# Patient Record
(demographics unavailable — no encounter records)

---

## 2024-11-05 NOTE — HISTORY OF PRESENT ILLNESS
[de-identified] : Age: 70 year F PMHx: HTN, HLD, Hypothyroidism Hand Dominance: RHD Chief Complaint: Left middle finger pain s/p trauma 05/16/24. Patient reports that she had closed her middle finger in a door, sustaining her injury. Patient went to [facility] 05/16/24 and had radiographs performed that confirmed the fracture. Patient was prompted to f/u with an orthopedist for further evaluation. Taking Cephalexin PRN. Reports an intermittent throb.  Trauma: 05/16/24 Outside Imaging/Treatment: radiographs from [facility] 05/16/24 OTC Medications: Cephalexin  OT/PT: none Bracing: finger in splint Pain worse with: palpation Pain better with: rest  06/10/24: F/u left middle finger.  Patient reports that she is doing well. Patient presents in splint and states that she wears it as a precaution. Patient reports no pain, only altered sensation at the distal aspect of her left middle finger. Patient reports that she completed her round of cephalexin.   *** NEW COMPLAINT*** 11/04/24: Patient presents with right hand issues onset late October 2024. Patient reports that she has noticed bumps on the distal aspects of the fingers of her right hand. Patient reports no pain or discomfort, but is concerned about the etiology of the bumps. Denies numbness/tingling.

## 2024-11-05 NOTE — IMAGING
[de-identified] : Left middle finger with resolved ecchymosis and swelling, no erythema nor drainage. Mild ttp at P3. Able to fully flex and extend at MCP, PIP and DIP with no rotational deformity. Sensation intact at radial and ulnar pulp. <2sec cap refill.  Left middle finger distal phalanx shaft fracture, minimally displaced. Alignment maintained. +callus.

## 2024-11-05 NOTE — ASSESSMENT
[FreeTextEntry1] : Left middle finger distal phalanx shaft fracture, minimally displaced - reviewed radiographs and pathoanatomy with patient. Discussed the current alignment both radiographically and clinically are within acceptable parameters to manage nonoperatively. Will manage in coban azul tape, ROM permitted. Elevate. Discussed risks of pain, stiffness, and displacement requiring intervention. Risk of infection.  EXAM Right finger with mucous cyst. No erythema nor drainage, mild ttp. Able to flex and extend at MCP, PIP and DIP. Sensation intact throughout. <2sec cap refill   Right finger radiographs with DIP arthrosis. No fracture nor dislocation. (3-view)   ASSESSMENT/PLAN Right finger mucous cyst - reviewed pathoanatomy with patient. Discussed management to consist of NSAIDs prn, activity modification and finger sleeves during use. Discussed excision is a potential operative intervention but is with a high rate of recurrence.   F/u prn

## 2024-12-12 NOTE — HISTORY OF PRESENT ILLNESS
[Sudden] : sudden [5] : 5 [0] : 0 [Burning] : burning [Dull/Aching] : dull/aching [Sharp] : sharp [Throbbing] : throbbing [Intermittent] : intermittent [Household chores] : household chores [Leisure] : leisure [Social interactions] : social interactions [Rest] : rest [Retired] : Work status: retired [de-identified] : Patient here for right ankle. Patient states NKI. Patient states pain is in the Achilles. Patient states pain started 2-3 weeks ago. Patient states pain is sharp and burning only with ROM. Patient had MRI of her right ankle on 10/10/24 at . Patient has been wearing custom orthotics.  Impression: 1) Subcortical cystic-appearing changes in the lateral process of the talus and the underlying anterosuperior calcaneus. This in addition to mild hindfoot valgus raises suspicion of lateral hindfoot instability. 2) Partial obliteration of fat signal in the sinus tarsi suggestive of sinus tarsi syndrome but clinical correlation recommended. 3) Mild posterior tibialis tendinosis. 4) Mild multifocal degenerative changes. [] : Post Surgical Visit: no [FreeTextEntry1] : Right ankle  [FreeTextEntry3] : 2-3 weeks ago  [FreeTextEntry5] : NKI  [de-identified] : Movement

## 2024-12-12 NOTE — DATA REVIEWED
[MRI] : MRI [Right] : of the right [Ankle] : ankle [I independently reviewed and interpreted images and report] : I independently reviewed and interpreted images and report [FreeTextEntry1] : Micki; Impression: 1) Subcortical cystic-appearing changes in the lateral process of the talus and the underlying anterosuperior calcaneus. This in addition to mild hindfoot valgus raises suspicion of lateral hindfoot instability. 2) Partial obliteration of fat signal in the sinus tarsi suggestive of sinus tarsi syndrome but clinical correlation recommended. 3) Mild posterior tibialis tendinosis. 4) Mild multifocal degenerative changes.

## 2024-12-12 NOTE — ASSESSMENT
[FreeTextEntry1] : 72 yo female presenting today with insertional achilles tendinitis, eufemia's deformity. Patient with posterior tibial tendon dysfunction which is managed with custom orthotics. Recommend non-surgical management -Rx PT given today -Discussed with patient that csi is not recommended due to risk of tendon injury -Activities as tolerated -Rest, ice, compression, elevation, NSAIDs PRN for pain.  -All questions answered -F/u 6 weeks  The diagnosis was explained in detail. The potential non-surgical and surgical treatments were reviewed. The relative risks and benefits of each option were considered relative to the patients age, activity level, medical history, symptom severity and previously attempted treatments.  The patient was advised to consult with their primary medical provider prior to initiation of any new medications to reduce the risk of adverse effects specific to their long-term home medications and medical history. The risk of gastrointestinal irritation and kidney injury specific to long-term NSAID use was discussed.  Entered by Shane Veronica PA-C acting as scribe. Dr. Owen Attestation The documentation recorded by the scribe, in my presence, accurately reflects the service I, Dr. Owen, personally performed, and the decisions made by me with my edits as appropriate.

## 2024-12-12 NOTE — PHYSICAL EXAM
[de-identified] : Examination of the right foot and ankle is as follows: INSPECTION: mild swelling over achilles tendon, but no abrasion, no laceration, no erythema, no ecchymosis, no gross deformity, no ecchymosis of foot or ankle PALPATION: achilles tendon insertion tenderness ROM: dorsiflexion 20 degrees, plantar flexion 40 degrees, inversion 30 degrees, eversion 20 degrees STRENGTH: dorsiflexion 5/5, plantarflexion 5/5, inversion 5/5, eversion 5/5, EHL 5/5, FHL 5/5 TESTING: negative Mcdonald test VASCULAR: dorsalis pedis pulse: 2+, posterior tibialis pulse: 2+ NEURO: Sensation present to light touch in all distributions GAIT: mildly antalgic, but patient ambulates without assistive device   X-rays of the right ankle 3 view AP/Lateral/Oblique: mild eufemia's deformity.

## 2025-02-13 NOTE — ASSESSMENT
[FreeTextEntry1] : 12/2023 MRI of L-Spine was reviewed today and is as follows:  Spondylolisthesis L3-4 with moderate central and severe bilateral foraminal stenosis  Moderate stenosis L4-5  72 yo female presents today for eval of her low back. Patient with persistent low back pain and LLE radicular symptoms/weakness. Recommend proceeding with MRI for further evaluation given persistent symptoms.   - Patient given prescription for MRI, follow up after study is completed to discuss results.   - Continue physical therapy to regain range of motion, strengthening and symptomatic improvement. Prescription given in office today.   - Has had injections with SBPM in the past  - Recent stent placement in 9/2024, on Plavix  - Recommend heating pad use to decrease muscle spasm - Discussed the importance of home exercises, including but not limited to hamstring stretching and core strengthening   Patient was educated on their diagnosis today. All questions answered and patient expressed understanding.  Follow up after MRI

## 2025-02-13 NOTE — HISTORY OF PRESENT ILLNESS
[de-identified] : Body part: lower back Better/ Worse/ Same since last visit: same Treatments since last visit: PT 2x a week at [facility] [place]  Difficulty with: prolonged standing Radicular symptoms: up into the middle back and shoulder blades, into the left thigh, denies numbness/tingling Current medications for pain: Lyrica, Tylenol PRN Assistive walking device:  none   Today's Pain: in the morning 6-7/10, now 5/10   **Patient had a cardiac stent placed in 9/2024 and is taking Plavix and ASA**

## 2025-03-20 NOTE — DATA REVIEWED
[MRI] : MRI [Lumbar Spine] : lumbar spine [I independently reviewed and interpreted images and report] : I independently reviewed and interpreted images and report [FreeTextEntry1] : Severe central stenosis L3/4 with bilateral foraminal stenosis L4/5 Advanced Central stenosis with bialteral foraminal stnosis R>L

## 2025-03-20 NOTE — HISTORY OF PRESENT ILLNESS
[de-identified] : Body part: lower back Better/ Worse/ Same since last visit: same Treatments since last visit: lumbar spine MRI at [facility], PT 1x a week at [facility] in [place], HEP PRN Difficulty with: prolonged standing Radicular symptoms: up into the middle back and shoulder blades, slightly into the left thigh, denies numbness/tingling Current medications for pain: Lyrica and Flexeril, Tylenol PRN Assistive walking device:  none   Today's Pain: 7/10

## 2025-03-20 NOTE — ASSESSMENT
[FreeTextEntry1] : 12/2023 MRI of L-Spine was reviewed today and is as follows:  Spondylolisthesis L3-4 with moderate central and severe bilateral foraminal stenosis  Moderate stenosis L4-5  70 yo female presents today for eval of her low back. Patient with persistent low back pain and LLE radicular symptoms/weakness. Reviewed MRI of Lumbar spine, demonstrated severe central and foraminal stenosis, progressed from previous imaging. At this time, recommend patient for MRI of thoracic region to evaluate further.   - Patient given prescription for MRI, follow up after study is completed to discuss results.   - Recommend physical therapy to regain range of motion, strengthening and symptomatic improvement. Prescription given in office today.   - At this time the patient is on Plavix, will withhold any injection therapies/surgical modalities  - Has had injections with SBPM in the past  - Recent stent placement in 9/2024, on Plavix  - Recommend heating pad use to decrease muscle spasm - Discussed the importance of home exercises, including but not limited to hamstring stretching and core strengthening   Patient was educated on their diagnosis today. All questions answered and patient expressed understanding.  Follow up 2 months

## 2025-03-25 NOTE — ASSESSMENT
[FreeTextEntry1] : 70 yo female presenting today with resolved insertional achilles tendinitis, eufemia's deformity. Patient with posterior tibial tendon dysfunction which is managed with custom orthotics. Recommend continued non-surgical management -transition from PT to HEP -Activities as tolerated -Rest, ice, compression, elevation, NSAIDs PRN for pain.  -All questions answered -F/u prn  The diagnosis was explained in detail. The potential non-surgical and surgical treatments were reviewed. The relative risks and benefits of each option were considered relative to the patients age, activity level, medical history, symptom severity and previously attempted treatments.  The patient was advised to consult with their primary medical provider prior to initiation of any new medications to reduce the risk of adverse effects specific to their long-term home medications and medical history. The risk of gastrointestinal irritation and kidney injury specific to long-term NSAID use was discussed.

## 2025-03-25 NOTE — PHYSICAL EXAM
[de-identified] : Examination of the right foot and ankle is as follows: INSPECTION: no swelling over achilles tendon, but no abrasion, no laceration, no erythema, no ecchymosis, no gross deformity, no ecchymosis of foot or ankle PALPATION: minimal achilles tendon insertion tenderness ROM: dorsiflexion 20 degrees, plantar flexion 40 degrees, inversion 30 degrees, eversion 20 degrees STRENGTH: dorsiflexion 5/5, plantarflexion 5/5, inversion 5/5, eversion 5/5, EHL 5/5, FHL 5/5 TESTING: negative Mcdonald test VASCULAR: dorsalis pedis pulse: 2+, posterior tibialis pulse: 2+ NEURO: Sensation present to light touch in all distributions GAIT: normal gait   X-rays of the right ankle 3 view AP/Lateral/Oblique: mild eufemia's deformity.

## 2025-04-11 NOTE — HISTORY OF PRESENT ILLNESS
[Patient reported mammogram was normal] : Patient reported mammogram was normal [Patient reported PAP Smear was normal] : Patient reported PAP Smear was normal [Patient reported colonoscopy was normal] : Patient reported colonoscopy was normal [N] : Patient denies prior pregnancies [Currently Active] : currently active [Men] : men [Vaginal] : vaginal [No] : No [FreeTextEntry1] : Patient here for annual visit.  Had a significant medical year requiring cardiac stenting in September! Is now cleared from cardiac rehab and doing ok. Saw UroGyn, Dr. Smith, and is now on medication for overactive bladder and feels it is helping. Is not having any VB. Denies breast lumps, pain or D/C.  [TextBox_4] : Overactive blader- followed by Dr. Smith [Mammogramdate] : 03/25 [TextBox_19] : TERRELL- Normal [PapSmeardate] : 01/23/23 [TextBox_31] : Negative [BoneDensityDate] : 3/25 [TextBox_37] : Normal spine, T= -1.0 to -1.6 in hip, stable- August [ColonoscopyDate] : 4/23/24 [TextBox_43] : with endoscopy- Benign polyps- due q 3 years [LMPDate] : 2004

## 2025-04-11 NOTE — PHYSICAL EXAM
[Appropriately responsive] : appropriately responsive [Alert] : alert [No Acute Distress] : no acute distress [No Lymphadenopathy] : no lymphadenopathy [Regular Rate Rhythm] : regular rate rhythm [No Murmurs] : no murmurs [Clear to Auscultation B/L] : clear to auscultation bilaterally [Soft] : soft [Non-tender] : non-tender [Non-distended] : non-distended [No HSM] : No HSM [No Lesions] : no lesions [No Mass] : no mass [Oriented x3] : oriented x3 [FreeTextEntry1] : Normal, no lesions [FreeTextEntry2] : Normal, no lesions [FreeTextEntry4] : Normal, no lesions seen or palpated.  Small amount of clear discharge in vault [FreeTextEntry5] : Smooth, pink, no lesions.  No cervical motion tenderness [FreeTextEntry6] : Anteverted, small, mobile, nontender.  No adnexal masses or tenderness bilaterally

## 2025-04-11 NOTE — PLAN
[FreeTextEntry1] : Patient doing well without any significant gynecologic issues. Uses over-the-counter moisturizers for atrophic vaginitis. Prescription for next year's mammogram and put into the system for her to have available. Is up-to-date with her bone density screening and will continue vitamin D supplements and calcium supplements.

## 2025-05-22 NOTE — DATA REVIEWED
[MRI] : MRI [Thoracic Spine] : thoracic spine [I independently reviewed and interpreted images and report] : I independently reviewed and interpreted images and report [FreeTextEntry1] : 3/2025 MRI of T-Spine was reviewed today and is as follows:  Multiple bulging discs without evidence of nerve compression

## 2025-05-22 NOTE — ASSESSMENT
[FreeTextEntry1] : 3/2025 MRI of T-Spine was reviewed today and is as follows:  Multiple bulging discs without evidence of nerve compression  12/2023 MRI of L-Spine was reviewed today and is as follows:  Spondylolisthesis L3-4 with moderate to severe central and severe bilateral foraminal stenosis  Moderate to severe stenosis L4-5  70 yo female presents today for eval of her low back. Patient with persistent low back pain and LLE radicular symptoms/weakness. She also had shoulder blade pain, which is likely coming from multilevel degenerative changes in her neck as opposed to thoracic spine, as thoracic MRI without significant nerve compression. However, her neck does not bother her. We are limited in treatment options at this time due to recent stent placement in September. Recommend conservative management at this time.   - Recommend occupational therapy to regain range of motion, strengthening and symptomatic improvement. Prescription given in office today.   - At this time the patient is on Plavix, will withhold any injection therapies/surgical modalities  - Has had injections with SBPM in the past  - We discussed that she may require laminectomy in her low back given severe stenosis at some point in future  - Recent stent placement in 9/2024, on Plavix  - Recommend heating pad use to decrease muscle spasm - Discussed the importance of home exercises, including but not limited to hamstring stretching and core strengthening   Patient was educated on their diagnosis today. All questions answered and patient expressed understanding.  Follow up PRN

## 2025-05-22 NOTE — HISTORY OF PRESENT ILLNESS
[de-identified] : Body part: Mid and Lower back- FU T spine MRI  Better/ Worse/ Same since last visit: Worse  Treatments since last visit: Started OT yesterday  Difficulty with: prolonged standing Radicular symptoms: up into the middle back and shoulder blades, slightly into the left thigh, denies numbness/tingling Current medications for pain: Lyrica and Flexeril, Tylenol PRN Assistive walking device:  none   Today's Pain: 7/10